# Patient Record
Sex: MALE | URBAN - METROPOLITAN AREA
[De-identification: names, ages, dates, MRNs, and addresses within clinical notes are randomized per-mention and may not be internally consistent; named-entity substitution may affect disease eponyms.]

---

## 2020-12-15 ENCOUNTER — OFFICE VISIT (OUTPATIENT)
Dept: URBAN - METROPOLITAN AREA CLINIC 36 | Facility: CLINIC | Age: 70
End: 2020-12-15
Payer: MEDICARE

## 2020-12-15 DIAGNOSIS — Z96.1 PRESENCE OF INTRAOCULAR LENS: ICD-10-CM

## 2020-12-15 DIAGNOSIS — H25.12 AGE-RELATED NUCLEAR CATARACT, LEFT EYE: ICD-10-CM

## 2020-12-15 PROCEDURE — 99204 OFFICE O/P NEW MOD 45 MIN: CPT | Performed by: OPHTHALMOLOGY

## 2020-12-15 PROCEDURE — 92134 CPTRZ OPH DX IMG PST SGM RTA: CPT | Performed by: OPHTHALMOLOGY

## 2020-12-15 RX ORDER — DORZOLAMIDE HYDROCHLORIDE AND TIMOLOL MALEATE 20; 5 MG/ML; MG/ML
SOLUTION/ DROPS OPHTHALMIC
Qty: 5 | Refills: 2 | Status: INACTIVE
Start: 2020-12-15 | End: 2020-12-16

## 2020-12-15 ASSESSMENT — INTRAOCULAR PRESSURE
OS: 13
OD: 13

## 2020-12-15 NOTE — IMPRESSION/PLAN
Impression: Glaucoma: H40.9. Plan: Per patient report, IOPs were 30s post-operatively. IOP improved today on Cosopt and alphagan. Will d/c brimonidine and continue cosopt.   

RTC 3-4 mo

## 2020-12-15 NOTE — IMPRESSION/PLAN
Impression: Vitreous degeneration, left eye: H43.812. Left. Plan: Patient notes floaters. Exam demonstrates a PVD without any RD or RT on exam.  The floaters are not debilitating to the patient and do not warrant surgery. Reassurance provided. Precautions discussed with the patient.

## 2020-12-15 NOTE — IMPRESSION/PLAN
Impression: Puckering of macula, left eye: H35.372. Plan: Exam and OCT demonstrate a Macular Pucker. The diagnosis, natural history, and prognosis of ERMs, as well as the risks and benefits of PPV/MP versus observation were discussed at length. Given the patient's concurrent retinal pathology, observation was recommended at this time.

## 2020-12-15 NOTE — IMPRESSION/PLAN
Impression: Macular cyst, hole, or pseudohole, right eye: H35.341. Right.
- h/o ? ERM s/p PPV
- new FTMH over summer, s/p PPV in [facility] x2 1st 07/2020 unsuccessful 2nd 09/2020 Plan: Exam and OCT demonstrates hole remains closed s/p PPV 3 months ago. Patient reports elevated IOP range in the 30's prior to coming to [address] [facility] and was placed on gtts for pressure. Fortunately, IOP is WNL today, recommend d/c brimonidine. Will follow closely while patient is in [address] 

RTC: 4 wks re-eval OCT OU

## 2021-04-13 ENCOUNTER — OFFICE VISIT (OUTPATIENT)
Dept: URBAN - METROPOLITAN AREA CLINIC 36 | Facility: CLINIC | Age: 71
End: 2021-04-13
Payer: MEDICARE

## 2021-04-13 DIAGNOSIS — H35.341 MACULAR CYST, HOLE, OR PSEUDOHOLE, RIGHT EYE: Primary | ICD-10-CM

## 2021-04-13 DIAGNOSIS — H43.812 VITREOUS DEGENERATION, LEFT EYE: ICD-10-CM

## 2021-04-13 DIAGNOSIS — H35.372 PUCKERING OF MACULA, LEFT EYE: ICD-10-CM

## 2021-04-13 DIAGNOSIS — H40.9 GLAUCOMA: ICD-10-CM

## 2021-04-13 PROCEDURE — 99214 OFFICE O/P EST MOD 30 MIN: CPT | Performed by: OPHTHALMOLOGY

## 2021-04-13 PROCEDURE — 92134 CPTRZ OPH DX IMG PST SGM RTA: CPT | Performed by: OPHTHALMOLOGY

## 2021-04-13 ASSESSMENT — INTRAOCULAR PRESSURE: OD: 26

## 2021-04-13 NOTE — IMPRESSION/PLAN
Impression: Macular cyst, hole, or pseudohole, right eye: H35.341. Right.
- h/o ? ERM s/p PPV
- new FTMH over summer, s/p PPV in [facility] x2 1st 07/2020 unsuccessful 2nd 09/2020 Plan: Exam and OCT demonstrates hole remains closed s/p PPV 7-8 months ago. Patient reports elevated IOP range in the 30's prior to coming to [place] and was placed on gtts for pressure. IOP is elevated today off of cosopt and brimonidine, recommend restart Cosopt. Will follow closely while patient is in [place]. 

RTC: 4 wks re-eval OCT OU